# Patient Record
Sex: MALE | Race: WHITE | NOT HISPANIC OR LATINO
[De-identification: names, ages, dates, MRNs, and addresses within clinical notes are randomized per-mention and may not be internally consistent; named-entity substitution may affect disease eponyms.]

---

## 2023-08-16 PROBLEM — Z00.00 ENCOUNTER FOR PREVENTIVE HEALTH EXAMINATION: Status: ACTIVE | Noted: 2023-08-16

## 2023-08-17 ENCOUNTER — APPOINTMENT (OUTPATIENT)
Age: 38
End: 2023-08-17
Payer: COMMERCIAL

## 2023-08-17 VITALS
DIASTOLIC BLOOD PRESSURE: 90 MMHG | OXYGEN SATURATION: 98 % | SYSTOLIC BLOOD PRESSURE: 142 MMHG | WEIGHT: 187 LBS | HEIGHT: 70 IN | HEART RATE: 51 BPM | BODY MASS INDEX: 26.77 KG/M2

## 2023-08-17 DIAGNOSIS — M79.89 OTHER SPECIFIED SOFT TISSUE DISORDERS: ICD-10-CM

## 2023-08-17 PROCEDURE — 99203 OFFICE O/P NEW LOW 30 MIN: CPT

## 2023-08-17 NOTE — PLAN
[FreeTextEntry1] : Mr. MIXON  was told significance of findings, options, risks and benefits were explained.  Informed consent for excision    left arm mass and epigastric  mass           , and potential risks, benefits and alternatives (surgical options were discussed including non-surgical options or the option of no surgery) to the planned surgery were discussed in depth.  All surgical options were discussed including non-surgical treatments.  He wishes to proceed with surgery.  We will plan for surgery on at the next available date, pending any required insurance pre-certification or pre-approval. He agrees to obtain any necessary pre-operative evaluations and testing prior to surgery. Patient advised to seek immediate medical attention with any acute change in symptoms or with the development of any new or worsening symptoms.  Patient's questions and concerns addressed to patient's satisfaction, and patient verbalized an understanding of the information discussed.

## 2023-08-17 NOTE — CONSULT LETTER
[Dear  ___] : Dear  [unfilled], [Consult Letter:] : I had the pleasure of evaluating your patient, [unfilled]. [Please see my note below.] : Please see my note below. [Consult Closing:] : Thank you very much for allowing me to participate in the care of this patient.  If you have any questions, please do not hesitate to contact me. [Sincerely,] : Sincerely, [FreeTextEntry3] : Jayme Sheikh MD, FACS

## 2023-08-17 NOTE — PHYSICAL EXAM
[Alert] : alert [Oriented to Person] : oriented to person [Oriented to Place] : oriented to place [Oriented to Time] : oriented to time [Calm] : calm [de-identified] : He  is alert, well-groomed, and in NAD   [de-identified] : anicteric.  Nasal mucosa pink, septum midline. Oral mucosa pink.  Tongue midline, Pharynx without exudates.   [de-identified] : Neck supple. Trachea midline. Thyroid isthmus barely palpable, lobes not felt.   [de-identified] : left arm mass is  mobile, Firm,  Smooth, non-tender,   Well defined. deep. No palpable lymph nodes.   Mass size - 5  cm x   4 cm.  epigastric  mass is  mobile, Firm,  Smooth, non-tender,   Well defined. deep. No palpable lymph nodes.   Mass size - 2  cm x  2 cm.

## 2023-08-17 NOTE — HISTORY OF PRESENT ILLNESS
[de-identified] : This is a 38 year  old patient who was referred by Dr. Nataly Springer with the chief complaint of having  left arm mass.  He reports having this condition for 3 years. He denies any trauma to the area.   He denies any fever or  night sweats. Appetite is good and weight is stable.  He states that recently the mass started to get  bigger and  more symptomatic. He wants to know if it could  be surgically  removed. he has multiple other soft tissue masses in other body parts

## 2023-09-07 ENCOUNTER — OUTPATIENT (OUTPATIENT)
Dept: OUTPATIENT SERVICES | Facility: HOSPITAL | Age: 38
LOS: 1 days | End: 2023-09-07
Payer: COMMERCIAL

## 2023-09-07 VITALS
RESPIRATION RATE: 17 BRPM | WEIGHT: 186.95 LBS | TEMPERATURE: 98 F | OXYGEN SATURATION: 99 % | DIASTOLIC BLOOD PRESSURE: 74 MMHG | HEIGHT: 70 IN | HEART RATE: 55 BPM | SYSTOLIC BLOOD PRESSURE: 121 MMHG

## 2023-09-07 DIAGNOSIS — M79.89 OTHER SPECIFIED SOFT TISSUE DISORDERS: ICD-10-CM

## 2023-09-07 DIAGNOSIS — Z98.890 OTHER SPECIFIED POSTPROCEDURAL STATES: Chronic | ICD-10-CM

## 2023-09-07 DIAGNOSIS — Z01.812 ENCOUNTER FOR PREPROCEDURAL LABORATORY EXAMINATION: ICD-10-CM

## 2023-09-07 NOTE — H&P PST ADULT - ASSESSMENT
38 yr old pleasant male with no significant medical history presents with soft tissue masses on left arm and epigastric areas. Pt had these areas about 3 years and seen an increase in size. Pt wants surgical removal. Pt schedule for excision of left arm and epigastric mass on 9/15/2023. All medical work up for surgery done by Dr Ingram [phone].

## 2023-09-07 NOTE — H&P PST ADULT - NSICDXFAMILYHX_GEN_ALL_CORE_FT
FAMILY HISTORY:  Father  Still living? Yes, Estimated age: 74  No known problems, Age at diagnosis: Age Unknown    Mother  Still living? Yes, Estimated age: 68  No known problems, Age at diagnosis: Age Unknown

## 2023-09-07 NOTE — H&P PST ADULT - HISTORY OF PRESENT ILLNESS
38 yr old pleasant male with no significant medical history presents with soft tissue masses on left arm and epigastric areas. Pt had these areas about 3 years and seen an increase in size. Pt wants surgical removal. Pt schedule for excision of left arm and epigastric mass on 9/15/2023. All medical work up for surgery done by Dr Ingram [phone].

## 2023-09-07 NOTE — H&P PST ADULT - NSICDXPROCEDURE_GEN_ALL_CORE_FT
PROCEDURES:  Excision, subcutaneous tumor, upper arm, less than 3 cm 07-Sep-2023 07:40:12  Becky Zambrano  Excision, subcutaneous tumor, abdominal wall, less than 3 cm 07-Sep-2023 07:40:47  Becky Zambrano

## 2023-09-07 NOTE — H&P PST ADULT - PROBLEM SELECTOR PLAN 1
Pt scheduled for excision of left arm mass and epigastric mass.      Pt instructed to be NPO the night before and the morning of surgery. Provided with chlorhexidene 4% solution to wash for 3 days including the morning of surgery. Written instructions given and reviewed with pt. Escort required post procedure. Tylenol to be used if needed one week prior to surgery.    Stop Bang Score=1 Pt low risk for RAUL

## 2023-09-12 PROCEDURE — G0463: CPT

## 2023-09-14 ENCOUNTER — TRANSCRIPTION ENCOUNTER (OUTPATIENT)
Age: 38
End: 2023-09-14

## 2023-09-15 ENCOUNTER — TRANSCRIPTION ENCOUNTER (OUTPATIENT)
Age: 38
End: 2023-09-15

## 2023-09-15 ENCOUNTER — RESULT REVIEW (OUTPATIENT)
Age: 38
End: 2023-09-15

## 2023-09-15 ENCOUNTER — APPOINTMENT (OUTPATIENT)
Dept: SURGERY | Facility: HOSPITAL | Age: 38
End: 2023-09-15
Payer: COMMERCIAL

## 2023-09-15 ENCOUNTER — OUTPATIENT (OUTPATIENT)
Dept: OUTPATIENT SERVICES | Facility: HOSPITAL | Age: 38
LOS: 1 days | End: 2023-09-15
Payer: COMMERCIAL

## 2023-09-15 VITALS
OXYGEN SATURATION: 99 % | SYSTOLIC BLOOD PRESSURE: 119 MMHG | DIASTOLIC BLOOD PRESSURE: 78 MMHG | TEMPERATURE: 98 F | HEIGHT: 70 IN | RESPIRATION RATE: 16 BRPM | WEIGHT: 186.95 LBS | HEART RATE: 51 BPM

## 2023-09-15 VITALS
DIASTOLIC BLOOD PRESSURE: 86 MMHG | OXYGEN SATURATION: 97 % | TEMPERATURE: 98 F | HEART RATE: 54 BPM | SYSTOLIC BLOOD PRESSURE: 110 MMHG | RESPIRATION RATE: 16 BRPM

## 2023-09-15 DIAGNOSIS — Z98.890 OTHER SPECIFIED POSTPROCEDURAL STATES: Chronic | ICD-10-CM

## 2023-09-15 DIAGNOSIS — M79.89 OTHER SPECIFIED SOFT TISSUE DISORDERS: ICD-10-CM

## 2023-09-15 PROCEDURE — 22902 EXC ABD LES SC < 3 CM: CPT

## 2023-09-15 PROCEDURE — 88307 TISSUE EXAM BY PATHOLOGIST: CPT | Mod: 26

## 2023-09-15 PROCEDURE — 24071 EXC ARM/ELBOW LES SC 3 CM/>: CPT | Mod: LT

## 2023-09-15 PROCEDURE — 22902 EXC ABD LES SC < 3 CM: CPT | Mod: AS

## 2023-09-15 PROCEDURE — 21555 EXC NECK LES SC < 3 CM: CPT

## 2023-09-15 PROCEDURE — 21555 EXC NECK LES SC < 3 CM: CPT | Mod: LT

## 2023-09-15 PROCEDURE — 88307 TISSUE EXAM BY PATHOLOGIST: CPT

## 2023-09-15 PROCEDURE — 24071 EXC ARM/ELBOW LES SC 3 CM/>: CPT | Mod: AS,LT

## 2023-09-15 RX ORDER — SODIUM CHLORIDE 9 MG/ML
3 INJECTION INTRAMUSCULAR; INTRAVENOUS; SUBCUTANEOUS EVERY 8 HOURS
Refills: 0 | Status: DISCONTINUED | OUTPATIENT
Start: 2023-09-15 | End: 2023-09-15

## 2023-09-15 NOTE — ASU PATIENT PROFILE, ADULT - NS PRO ABUSE SCREEN AFRAID ANYONE YN
Jovanna Mobley Patient Age: 56 year old  MESSAGE: Interpreting service used: No    Insurance on file confirmed with caller: Yes    IM/FP- .     Net from [facility]  Is requesting to speak with clinical staff in regards to Patient having another EKG r    Message read back to caller for accuracy: Yes       ALLERGIES:  Sulfamethoxazole-trimethoprim  Current Outpatient Medications   Medication Sig Dispense Refill   • HYDROcodone-acetaminophen (Norco) 5-325 MG per tablet Take 1 tablet by mouth every 6 hours as needed for Pain (May take up to 2 tablets every 6 hours for severe pain). 10 tablet 0   • pantoprazole (PROTONIX) 40 MG tablet Take 1 tablet by mouth daily. 30 tablet 2   • nabumetone (RELAFEN) 500 MG tablet Take 1 tablet by mouth in the morning and 1 tablet in the evening. Do all this for 15 days. With food 30 tablet 0   • fluticasone (FLONASE) 50 MCG/ACT nasal spray Spray 1 spray in each nostril daily. 16 g 12   • sertraline (ZOLOFT) 100 MG tablet TAKE 1 TABLET BY MOUTH EVERY DAY 90 tablet 1   • DISPENSE Tretinoin 0.05% cream versa base Apply to face HS. Needs appointment for further refills Call ASAP 45 g 11     No current facility-administered medications for this visit.     PHARMACY to use:  Please ask Patient             Pharmacy preference(s) on file:   [facility]/pharmacy #[id number] - [address] [address]  Phone: [phone] Fax: [phone]    [facility] #[id number] - [address] [address]  Phone: [phone] Fax: [phone]      CALL BACK INFO: Ok to leave response (including medical information) on answering machine      PCP: Little Ramirez DO         INS: Payor: GINGER / Plan: OPEN ACCESS PLUS / Product Type: POS MISC   PATIENT ADDRESS:  [address] [place] [id number]     no

## 2023-09-15 NOTE — ASU DISCHARGE PLAN (ADULT/PEDIATRIC) - ASU DC SPECIAL INSTRUCTIONSFT
Please follow-up with your surgeon in 1 week. Drink plenty of fluids and rest as needed. Call for any fever over 101, nausea, vomiting, severe pain, no passing of gas or bowel movement.       DIET   You may resume your regular diet as normal.       SURGICAL SITES   Remove outer dressing and keep white steri-strips in place allowing them to fall off on their own. You may shower 48 hours post-operatively but do not bathe or soak in the water for 1-2 weeks; pat dry. If you notice any signs of surgical site infection (ie. redness, swelling, pain, pus drainage), please seek medical care immediately.      ACTIVITY   Do not lift anything heavier than 10 pounds for 2 weeks (2-3 months for hernia, no exercise for 2 weeks) and avoid strenuous activity for 4-6 weeks.       PAIN CONTROL   You may take Motrin 600mg (with food) or Tylenol 650mg as needed for mild pain. Stagger one medication 3 hours after the other for maximum pain control. Maximum daily dose of Tylenol should not exceed 4grams/day. Please follow-up with your surgeon in 2 weeks. Drink plenty of fluids and rest as needed. Call for any fever over 101, nausea, vomiting, severe pain, no passing of gas or bowel movement.       DIET   You may resume your regular diet as normal.       SURGICAL SITES   Remove outer dressing and keep white steri-strips in place allowing them to fall off on their own. You may shower 48 hours post-operatively but do not bathe or soak in the water for 1-2 weeks; pat dry. If you notice any signs of surgical site infection (ie. redness, swelling, pain, pus drainage), please seek medical care immediately.      ACTIVITY   Do not lift anything heavier than 10 pounds for 2 weeks (2-3 months for hernia, no exercise for 2 weeks) and avoid strenuous activity for 4-6 weeks.       PAIN CONTROL   You may take Motrin 600mg (with food) or Tylenol 650mg as needed for mild pain. Stagger one medication 3 hours after the other for maximum pain control. Maximum daily dose of Tylenol should not exceed 4grams/day.

## 2023-09-15 NOTE — BRIEF OPERATIVE NOTE - OPERATION/FINDINGS
Wound class 1   Upper arm mass 4cm x 3cm   R chest wall mass 1.5cm x 1 cm   Epigastric abdominal wall mass 1.5cm x 1cm  L upper abdominal wall mass 2cm x 1cm

## 2023-09-15 NOTE — BRIEF OPERATIVE NOTE - NSICDXBRIEFPROCEDURE_GEN_ALL_CORE_FT
PROCEDURES:  Surgical removal of mass of chest wall with reconstruction 15-Sep-2023 11:55:38 Surgical removal of abdominal wall masses x2, surgical removal of LUE mass Golden Hoffmann

## 2023-09-15 NOTE — ASU DISCHARGE PLAN (ADULT/PEDIATRIC) - CARE PROVIDER_API CALL
Jayme Sheikh  Surgery  [address]  [place] [address]  Phone: [phone]  Fax: [phone]  Follow Up Time:    Jayme Sheikh  Surgery  [address] [facility] [facility] [address] [facility]  [place] [address]  Phone: [phone]  Fax: [phone]  Follow Up Time: 2 weeks

## 2023-09-21 LAB — SURGICAL PATHOLOGY STUDY: SIGNIFICANT CHANGE UP
